# Patient Record
Sex: FEMALE
[De-identification: names, ages, dates, MRNs, and addresses within clinical notes are randomized per-mention and may not be internally consistent; named-entity substitution may affect disease eponyms.]

---

## 2019-01-31 ENCOUNTER — HOSPITAL ENCOUNTER (EMERGENCY)
Dept: HOSPITAL 43 - DL.ED | Age: 74
Discharge: NURSING, REHAB OR LONG-TERM CARE | End: 2019-01-31
Payer: MEDICARE

## 2019-01-31 DIAGNOSIS — Z79.84: ICD-10-CM

## 2019-01-31 DIAGNOSIS — E11.9: ICD-10-CM

## 2019-01-31 DIAGNOSIS — S72.001A: Primary | ICD-10-CM
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
## 2019-01-31 DIAGNOSIS — Z79.899: ICD-10-CM

## 2019-01-31 DIAGNOSIS — I10: ICD-10-CM
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
## 2019-01-31 DIAGNOSIS — E78.00: ICD-10-CM

## 2019-01-31 LAB
ANION GAP SERPL CALC-SCNC: 17.4 MMOL/L
CHLORIDE SERPL-SCNC: 95 MMOL/L (ref 101–111)
SODIUM SERPL-SCNC: 133 MMOL/L (ref 135–145)

## 2019-01-31 NOTE — EDM.PDOC
ED HPI GENERAL MEDICAL PROBLEM





- General


Chief Complaint: Lower Extremity Injury/Pain


Stated Complaint: AMBULANCE


Time Seen by Provider: 01/31/19 13:46


Source of Information: Reports: Patient, RN, RN Notes Reviewed


History Limitations: Reports: No Limitations





- History of Present Illness


INITIAL COMMENTS - FREE TEXT/NARRATIVE: 


Patient presents to ER per [facility] with complaint of pain 

right hip. She fell at a local restaurant. She did not hit her head. No loss of 

consciousness. No blood thinners.  


Onset: Today


Location: Reports: Lower Extremity, Right


Quality: Reports: Ache


Severity: Severe


Improves with: Reports: None


Worsens with: Reports: None


Associated Symptoms: Reports: No Other Symptoms


  ** Right Hip


Pain Score (Numeric/FACES): 5





- Related Data


 Allergies











Allergy/AdvReac Type Severity Reaction Status Date / Time


 


No Known Allergies Allergy   Verified 01/31/19 14:12











Home Meds: 


 Home Meds





Levothyroxine [Synthroid] 50 mcg PO ACBREAKFAST 01/31/19 [History]


Lisinopril 20 mg PO DAILY 01/31/19 [History]


Pioglitazone HCl 22.5 mg PO DAILY 01/31/19 [History]


Simvastatin 20 mg PO DAILY 01/31/19 [History]


amLODIPine Besylate [Amlodipine Besylate] 10 mg PO DAILY 01/31/19 [History]


metFORMIN HCl [Metformin HCl] 500 mg PO BID 01/31/19 [History]











Past Medical History


HEENT History: Reports: Cataract


Cardiovascular History: Reports: High Cholesterol, Hypertension


Endocrine/Metabolic History: Reports: Diabetes, Type II





- Past Surgical History


Female  Surgical History: Reports: Hysterectomy


Musculoskeletal Surgical History: Reports: Knee Replacement





Social & Family History





- Family History


Family Medical History: Noncontributory





- Tobacco Use


Smoking Status *Q: Never Smoker





- Caffeine Use


Caffeine Use: Reports: Coffee





- Recreational Drug Use


Recreational Drug Use: No





Review of Systems





- Review of Systems


Review Of Systems: ROS reveals no pertinent complaints other than HPI.





ED EXAM, GENERAL





- Physical Exam


Exam: See Below


Exam Limited By: No Limitations


General Appearance: Alert, WD/WN, No Apparent Distress


Eye Exam: Bilateral Eye: EOMI, Normal Inspection, PERRL


Ears: Normal External Exam, Normal Canal, Hearing Grossly Normal, Normal TMs


Nose: Normal Inspection, Normal Mucosa, No Blood


Throat/Mouth: Normal Inspection, Normal Lips, Normal Teeth, Normal Gums, Normal 

Oropharynx, Normal Voice, No Airway Compromise


Head: Atraumatic, Normocephalic


Neck: Normal Inspection, Supple, Non-Tender, Full Range of Motion


Respiratory/Chest: Crackles (right base)


Cardiovascular: Normal Peripheral Pulses, Regular Rate, Rhythm, No Edema, No 

Gallop, No JVD, No Murmur, No Rub


GI/Abdominal: Tender (right lower quadrant)


 (Female) Exam: Deferred


Rectal (Female) Exam: Deferred


Back Exam: Normal Inspection


Extremities: Other (Right hip decreased range of motion with no shortening. 

Extgernally rotated pedal pulses +2. )


Neurological: Alert, Oriented, CN II-XII Intact, Normal Cognition, Normal Gait, 

Normal Reflexes, No Motor/Sensory Deficits


Psychiatric: Normal Affect, Normal Mood


Skin Exam: Warm, Dry, Intact, Normal Color, No Rash


Lymphatic: No Adenopathy





Course





- Vital Signs


Last Recorded V/S: 


 Last Vital Signs











Temp  97.8 F   01/31/19 13:56


 


Pulse  71   01/31/19 13:56


 


Resp  14   01/31/19 13:56


 


BP  170/67 H  01/31/19 13:56


 


Pulse Ox  100   01/31/19 13:56














- Orders/Labs/Meds


Orders: 


 Active Orders 24 hr











 Category Date Time Status


 


 Cortez Catheter Insertion [Insert Urinary Catheter] [OM. Care  01/31/19 14:45 

Ordered





 PC] Q24H   


 


 Urinary Catheter Assessment [RC] ASDIRECTED Care  01/31/19 14:32 Active











Labs: 


 Laboratory Tests











  01/31/19 01/31/19 01/31/19 Range/Units





  14:30 14:30 14:31 


 


WBC  10.3 H    (5.0-10.0)  10^3/uL


 


RBC  4.70    (4.2-5.4)  10^6/uL


 


Hgb  13.3    (12.0-16.0)  g/dL


 


Hct  40.7    (37.0-47.0)  %


 


MCV  86.6    ()  fL


 


MCH  28.3    (27.0-34.0)  pg


 


MCHC  32.7 L    (33.0-35.0)  g/dL


 


Plt Count  241    (150-450)  10^3/uL


 


Neut % (Auto)  78.8 H    (42.2-75.2)  %


 


Lymph % (Auto)  15.3 L    (20.5-50.1)  %


 


Mono % (Auto)  5.2    (2-8)  %


 


Eos % (Auto)  0.5 L    (1.0-3.0)  %


 


Baso % (Auto)  0.2    (0.0-1.0)  %


 


Sodium   133 L   (135-145)  mmol/L


 


Potassium   4.4   (3.6-5.0)  mmol/L


 


Chloride   95 L   (101-111)  mmol/L


 


Carbon Dioxide   25.0   (21.0-31.0)  mmol/L


 


Anion Gap   17.4   


 


BUN   12   (7-18)  mg/dL


 


Creatinine   0.8   (0.6-1.3)  mg/dL


 


Est Cr Clr Drug Dosing   54.67   mL/min


 


Estimated GFR (MDRD)   > 60   


 


BUN/Creatinine Ratio   15.00   


 


Glucose   411 H*   ()  mg/dL


 


Calcium   9.2   (8.4-10.2)  mg/dl


 


Total Bilirubin   1.2 H   (0.2-1.0)  mg/dL


 


AST   16   (10-42)  IU/L


 


ALT   13   (10-60)  IU/L


 


Alkaline Phosphatase   119   ()  IU/L


 


Total Protein   7.0   (6.7-8.2)  g/dl


 


Albumin   3.8   (3.2-5.5)  g/dl


 


Globulin   3.2   


 


Albumin/Globulin Ratio   1.19   


 


Urine Color    Yellow  (YELLOW)  


 


Urine Appearance    Clear  (CLEAR)  


 


Urine pH    6.5  (5.0-9.0)  


 


Ur Specific Gravity    1.015  (1.005-1.030)  


 


Urine Protein    Trace H  (NEGATIVE)  


 


Urine Glucose (UA)    500 H  (NEGATIVE)  


 


Urine Ketones    Negative  (NEGATIVE)  


 


Urine Occult Blood    Trace-intact H  (NEGATIVE)  


 


Urine Nitrite    Negative  (NEGATIVE)  


 


Urine Bilirubin    Negative  (NEGATIVE)  


 


Urine Urobilinogen    0.2  (0.2-1.0)  mg/dL


 


Ur Leukocyte Esterase    Negative  (NEGATIVE)  


 


Urine RBC    0-5  /HPF


 


Urine WBC    0-5  (0-5/HPF)  /HPF


 


Ur Epithelial Cells    Rare  /HPF


 


Urine Bacteria    Rare  (0-FEW/HPF)  /HPF











Meds: 


Medications














Discontinued Medications














Generic Name Dose Route Start Last Admin





  Trade Name Freq  PRN Reason Stop Dose Admin


 


Fentanyl  25 mcg  01/31/19 15:30  01/31/19 15:36





  Sublimaze  IVPUSH  01/31/19 15:31  25 mcg





  ONETIME ONE   Administration





     





     





     





     














- Radiology Interpretation


Free Text/Narrative:: 


Right hip xray:


Impacted right femoral neck fracture


See rad report








Departure





- Departure


Time of Disposition: 16:00


Disposition: DC/[facility] to [facility]


Condition: Fair


Clinical Impression: 


 Fracture of neck of femur, hip








- Discharge Information


*PRESCRIPTION DRUG MONITORING PROGRAM REVIEWED*: No


*COPY OF PRESCRIPTION DRUG MONITORING REPORT IN PATIENT ANISHA: No


Forms:  ED Department Discharge, Interfacility Transfer EMTALA





- My Orders


Last 24 Hours: 


My Active Orders





01/31/19 14:32


Urinary Catheter Assessment [RC] ASDIRECTED 





01/31/19 14:45


Cortez Catheter Insertion [Insert Urinary Catheter] [OM.PC] Q24H 














- Assessment/Plan


Last 24 Hours: 


My Active Orders





01/31/19 14:32


Urinary Catheter Assessment [RC] ASDIRECTED 





01/31/19 14:45


Cortez Catheter Insertion [Insert Urinary Catheter] [OM.PC] Q24H